# Patient Record
Sex: MALE | Race: WHITE | NOT HISPANIC OR LATINO | Employment: FULL TIME | URBAN - METROPOLITAN AREA
[De-identification: names, ages, dates, MRNs, and addresses within clinical notes are randomized per-mention and may not be internally consistent; named-entity substitution may affect disease eponyms.]

---

## 2023-11-03 PROBLEM — N18.9 ACUTE KIDNEY INJURY SUPERIMPOSED ON CHRONIC KIDNEY DISEASE: Status: ACTIVE | Noted: 2023-11-03

## 2023-11-03 PROBLEM — I10 PRIMARY HYPERTENSION: Status: ACTIVE | Noted: 2023-11-03

## 2023-11-03 PROBLEM — E11.9 TYPE 2 DIABETES MELLITUS, WITHOUT LONG-TERM CURRENT USE OF INSULIN: Status: ACTIVE | Noted: 2023-11-03

## 2023-11-03 PROBLEM — I25.10 CORONARY ARTERY DISEASE: Status: ACTIVE | Noted: 2023-11-03

## 2023-11-03 PROBLEM — D50.9 IRON DEFICIENCY ANEMIA: Status: ACTIVE | Noted: 2023-11-03

## 2023-11-03 PROBLEM — K92.2 LOWER GI BLEED: Status: ACTIVE | Noted: 2023-11-03

## 2023-11-03 PROBLEM — G47.33 OSA (OBSTRUCTIVE SLEEP APNEA): Status: ACTIVE | Noted: 2023-11-03

## 2023-11-03 PROBLEM — N17.9 ACUTE KIDNEY INJURY SUPERIMPOSED ON CHRONIC KIDNEY DISEASE: Status: ACTIVE | Noted: 2023-11-03

## 2023-11-03 PROBLEM — D50.0 ANEMIA DUE TO GI BLOOD LOSS: Status: ACTIVE | Noted: 2023-11-03

## 2023-11-03 PROBLEM — N40.0 BPH (BENIGN PROSTATIC HYPERPLASIA): Status: ACTIVE | Noted: 2023-11-03

## 2023-11-16 ENCOUNTER — TELEPHONE (OUTPATIENT)
Dept: NEPHROLOGY | Facility: CLINIC | Age: 53
End: 2023-11-16

## 2023-11-16 NOTE — TELEPHONE ENCOUNTER
----- Message from Daisy Rider sent at 11/16/2023 10:05 AM CST -----  Type:  Sooner Apoointment Request    Caller is requesting a sooner appointment.  Caller declined first available appointment listed below.  Caller will not accept being placed on the waitlist and is requesting a message be sent to doctor.  Name of Caller:pt   When is the first available appointment?  Symptoms:high blood pressure low blood count/CKD 3   Would the patient rather a call back or a response via [facility]? call  Best Call Back Number:[phone]  Additional Information: would like to est care with provider

## 2023-11-16 NOTE — TELEPHONE ENCOUNTER
----- Message from Nani Esquivel sent at 11/16/2023 10:51 AM CST -----  Contact: self  Type:  Patient Returning Call    Who Called:  pt  Who Left Message for Patient:  rk  Does the patient know what this is regarding?:  yes  Best Call Back Number:  [phone]   Additional Information:  please call

## 2024-02-05 PROBLEM — K92.2 LOWER GI BLEED: Status: RESOLVED | Noted: 2023-11-03 | Resolved: 2024-02-05

## 2024-02-05 PROBLEM — N17.9 ACUTE KIDNEY INJURY SUPERIMPOSED ON CHRONIC KIDNEY DISEASE: Status: RESOLVED | Noted: 2023-11-03 | Resolved: 2024-02-05

## 2024-02-05 PROBLEM — N18.9 ACUTE KIDNEY INJURY SUPERIMPOSED ON CHRONIC KIDNEY DISEASE: Status: RESOLVED | Noted: 2023-11-03 | Resolved: 2024-02-05

## 2024-06-12 ENCOUNTER — TELEPHONE (OUTPATIENT)
Dept: NEPHROLOGY | Facility: CLINIC | Age: 54
End: 2024-06-12
Payer: COMMERCIAL

## 2024-06-12 NOTE — TELEPHONE ENCOUNTER
----- Message from Michoacano Wolf sent at 6/12/2024  9:06 AM CDT -----  Regarding: Sooner Appt  Type:  Sooner Appointment Request    Caller is requesting a sooner appointment.  Caller declined first available appointment listed below.  Caller will not accept being placed on the waitlist and is requesting a message be sent to doctor.    Name of Caller:Pt    When is the first available appointment?sept    Symptoms:est care/ clearance for a cardio procedure    Would the patient rather a call back or a response via [facility]? Call back    Best Call Back Number:[phone]      Additional Information: Sts he really would like to see Dr France .   Please advise -- Thank you

## 2024-06-13 NOTE — TELEPHONE ENCOUNTER
Elif scheduled patient to come in Monday at 1 pm.I called the patient and verified patient's appointment.

## 2024-06-13 NOTE — TELEPHONE ENCOUNTER
----- Message from Larry Rosales sent at 6/13/2024  1:16 PM CDT -----  Regarding: ret call  Contact: patient  Type:  Patient Returning Call    Who Called:  Patient     Who Left Message for Patient:  Elif Murphy    Does the patient know what this is regarding?:  yes     Best Call Back Number:  [phone]    Additional Information:  thanks

## 2024-06-17 ENCOUNTER — OFFICE VISIT (OUTPATIENT)
Dept: NEPHROLOGY | Facility: CLINIC | Age: 54
End: 2024-06-17
Payer: COMMERCIAL

## 2024-06-17 VITALS
HEIGHT: 74 IN | DIASTOLIC BLOOD PRESSURE: 76 MMHG | HEART RATE: 72 BPM | OXYGEN SATURATION: 98 % | BODY MASS INDEX: 32.04 KG/M2 | SYSTOLIC BLOOD PRESSURE: 172 MMHG

## 2024-06-17 DIAGNOSIS — N18.4 TYPE 2 DIABETES MELLITUS WITH STAGE 4 CHRONIC KIDNEY DISEASE, WITHOUT LONG-TERM CURRENT USE OF INSULIN: Primary | ICD-10-CM

## 2024-06-17 DIAGNOSIS — N40.0 BENIGN PROSTATIC HYPERPLASIA, UNSPECIFIED WHETHER LOWER URINARY TRACT SYMPTOMS PRESENT: ICD-10-CM

## 2024-06-17 DIAGNOSIS — I25.10 CORONARY ARTERY DISEASE, UNSPECIFIED VESSEL OR LESION TYPE, UNSPECIFIED WHETHER ANGINA PRESENT, UNSPECIFIED WHETHER NATIVE OR TRANSPLANTED HEART: ICD-10-CM

## 2024-06-17 DIAGNOSIS — I10 PRIMARY HYPERTENSION: ICD-10-CM

## 2024-06-17 DIAGNOSIS — E11.22 TYPE 2 DIABETES MELLITUS WITH STAGE 4 CHRONIC KIDNEY DISEASE, WITHOUT LONG-TERM CURRENT USE OF INSULIN: Primary | ICD-10-CM

## 2024-06-17 PROCEDURE — 3078F DIAST BP <80 MM HG: CPT | Mod: CPTII,S$GLB,, | Performed by: STUDENT IN AN ORGANIZED HEALTH CARE EDUCATION/TRAINING PROGRAM

## 2024-06-17 PROCEDURE — 3066F NEPHROPATHY DOC TX: CPT | Mod: CPTII,S$GLB,, | Performed by: STUDENT IN AN ORGANIZED HEALTH CARE EDUCATION/TRAINING PROGRAM

## 2024-06-17 PROCEDURE — 99999 PR PBB SHADOW E&M-EST. PATIENT-LVL III: CPT | Mod: PBBFAC,,, | Performed by: STUDENT IN AN ORGANIZED HEALTH CARE EDUCATION/TRAINING PROGRAM

## 2024-06-17 PROCEDURE — 4010F ACE/ARB THERAPY RXD/TAKEN: CPT | Mod: CPTII,S$GLB,, | Performed by: STUDENT IN AN ORGANIZED HEALTH CARE EDUCATION/TRAINING PROGRAM

## 2024-06-17 PROCEDURE — 3008F BODY MASS INDEX DOCD: CPT | Mod: CPTII,S$GLB,, | Performed by: STUDENT IN AN ORGANIZED HEALTH CARE EDUCATION/TRAINING PROGRAM

## 2024-06-17 PROCEDURE — 99204 OFFICE O/P NEW MOD 45 MIN: CPT | Mod: S$GLB,,, | Performed by: STUDENT IN AN ORGANIZED HEALTH CARE EDUCATION/TRAINING PROGRAM

## 2024-06-17 PROCEDURE — 1159F MED LIST DOCD IN RCRD: CPT | Mod: CPTII,S$GLB,, | Performed by: STUDENT IN AN ORGANIZED HEALTH CARE EDUCATION/TRAINING PROGRAM

## 2024-06-17 PROCEDURE — 3077F SYST BP >= 140 MM HG: CPT | Mod: CPTII,S$GLB,, | Performed by: STUDENT IN AN ORGANIZED HEALTH CARE EDUCATION/TRAINING PROGRAM

## 2024-06-17 RX ORDER — PANTOPRAZOLE SODIUM 40 MG/1
40 TABLET, DELAYED RELEASE ORAL
COMMUNITY

## 2024-06-17 RX ORDER — FUROSEMIDE 40 MG/1
40 TABLET ORAL DAILY PRN
COMMUNITY
End: 2024-06-17 | Stop reason: SDUPTHER

## 2024-06-17 RX ORDER — VALSARTAN 320 MG/1
1 TABLET ORAL DAILY
COMMUNITY
Start: 2023-11-07

## 2024-06-17 RX ORDER — CLONIDINE HYDROCHLORIDE 0.1 MG/1
0.2 TABLET ORAL
COMMUNITY
End: 2024-06-17

## 2024-06-17 RX ORDER — NITROGLYCERIN 0.4 MG/1
TABLET SUBLINGUAL
COMMUNITY

## 2024-06-17 RX ORDER — FUROSEMIDE 40 MG/1
40 TABLET ORAL 2 TIMES DAILY
Qty: 180 TABLET | Refills: 3 | Status: SHIPPED | OUTPATIENT
Start: 2024-06-17 | End: 2025-06-12

## 2024-06-17 RX ORDER — CLONIDINE HYDROCHLORIDE 0.2 MG/1
TABLET ORAL
COMMUNITY

## 2024-06-17 RX ORDER — EMPAGLIFLOZIN 10 MG/1
1 TABLET, FILM COATED ORAL DAILY
COMMUNITY
Start: 2023-11-07

## 2024-06-17 NOTE — PROGRESS NOTES
Subjective:       Patient ID: Yo Hassan is a 54 y.o. White male who presents for new patient evaluation for chronic renal failure.    Yo Hassan is referred by Gagan Sunshine NP-C to be evaluated for chronic renal failure.  He has a pertinent past medical history of diabetes mellitus type 2 with history of diabetic retinopathy (dx 2007), hypertension (dx around 20 years ago), sleep apnea on CPAP (2019), coronary artery disease s/p stenting and prior history of reported chronic kidney disease.  We reviewed his lab trends at chair side. Serum creatinine every much last year very between 1.4 and 1.8 mg/dL. Most recent kidney panel from 2 weeks ago shows a serum creatinine now 2.9 mg/dL with an calculated EGFR 25 mL a minute.    In terms of his renal history, he denies hospitalizations for prior SAGE requiring RRT. He had a remote history of recurrent kidney stones d/t primary hyperparathyroidism. No reported history of frequent or recurrent use of NSAIDs/COXI. He has a history of morphea, localized scleroderma. Denies any pertinent family history of known kidney disease, or family members diagnosed with ESRD requiring dialysis.  Smoking Hx: occasional 1-2 cigs per month  Other pertinent urologic history: has BPH symptoms, no formal dx  Fluid intake: drinks 1-2L of water per day.     He has no uremic or symptoms and is in his usual state of health. He does report occasional frothy urine. He does report increased fatigue last week but improving recently. He reports gaining 30lbs over the last six months.  When factoring today's height and weight into the 2021 CKD epi equation, his EGFR is close to 36 mL a minute.      Review of Systems   Constitutional:  Positive for fatigue and unexpected weight change. Negative for chills, diaphoresis and fever.   Respiratory:  Negative for cough and shortness of breath.    Cardiovascular:  Negative for chest pain and leg swelling.   Gastrointestinal:  Negative for  abdominal pain, diarrhea, nausea and vomiting.   Genitourinary:  Negative for difficulty urinating, dysuria and hematuria.   Musculoskeletal:  Negative for myalgias.   Neurological:  Negative for headaches.   Hematological:  Does not bruise/bleed easily.       The past medical, family and social histories were reviewed for this encounter.     Past Medical History:   Diagnosis Date    Diabetes mellitus     Hypertension     Sleep apnea      Past Surgical History:   Procedure Laterality Date    COLONOSCOPY N/A 11/2/2023    Procedure: COLONOSCOPY with EMR;  Surgeon: Larry Howard Jr., MD;  Location: [facility] ENDO;  Service: Endoscopy;  Laterality: N/A;    COLONOSCOPY N/A 11/3/2023    Procedure: COLONOSCOPY;  Surgeon: Larry Howard Jr., MD;  Location: [facility] ENDO;  Service: Endoscopy;  Laterality: N/A;    CORONARY ANGIOGRAPHY      x2 stents    ESOPHAGOGASTRODUODENOSCOPY N/A 11/2/2023    Procedure: EGD (ESOPHAGOGASTRODUODENOSCOPY);  Surgeon: Larry Howard Jr., MD;  Location: [facility] ENDO;  Service: Endoscopy;  Laterality: N/A;    HERNIA REPAIR       Social History     Socioeconomic History    Marital status:    Tobacco Use    Smoking status: Never     Current Outpatient Medications   Medication Sig    aspirin 81 MG Chew Take 81 mg by mouth once daily. **ON HOLD FOR 5 DAYS 11-7-2023**    carvediloL (COREG) 25 MG tablet Take 25 mg by mouth 2 (two) times daily.    cloNIDine (CATAPRES) 0.2 MG tablet TAKE 1 TABLET BY MOUTH AS NEEDED FOR BLOOD PRESSURE OVER 160    clopidogreL (PLAVIX) 75 mg tablet Take 75 mg by mouth once daily. **ON HOLD FOR 5 DAYS 11-7-2023**    cyclobenzaprine (FLEXERIL) 10 MG tablet Take 10 mg by mouth 2 (two) times daily as needed for Muscle spasms.    ezetimibe (ZETIA) 10 mg tablet Take 10 mg by mouth once daily.    fenofibrate 160 MG Tab Take 160 mg by mouth once daily.    ferrous sulfate (FEOSOL) 325 mg (65 mg iron) Tab tablet Take 1 tablet (325 mg total) by mouth every other day.    furosemide  "(LASIX) 40 MG tablet Take 40 mg by mouth daily as needed.    glimepiride (AMARYL) 4 MG tablet Take 1 tablet by mouth 2 (two) times daily.    JARDIANCE 10 mg tablet Take 1 tablet by mouth once daily.    NIFEdipine (ADALAT CC) 60 MG TbSR Take 90 mg by mouth every evening.    nitroGLYCERIN (NITROSTAT) 0.4 MG SL tablet use as directed    pantoprazole (PROTONIX) 40 MG tablet Take 40 mg by mouth.    tamsulosin (FLOMAX) 0.4 mg Cap Take 0.4 mg by mouth once daily.    valsartan (DIOVAN) 320 MG tablet Take 1 tablet by mouth once daily.    valsartan-hydrochlorothiazide (DIOVAN-HCT) 320-25 mg per tablet Take 1 tablet by mouth once daily.    zolpidem (AMBIEN) 10 mg Tab Take 10 mg by mouth nightly as needed for Insomnia.    cloNIDine (CATAPRES) 0.1 MG tablet 0.2 mg. (Patient not taking: Reported on 6/17/2024)    insulin detemir U-100, Levemir, 100 unit/mL (3 mL) SubQ InPn pen Inject 10 Units into the skin every evening. (Patient not taking: Reported on 6/17/2024)    vit C,H-On-hosms-lutein-zeaxan (EYE MULTIVIT, LUTEIN-ZEAXAN,) 410-36-20-2-5 mg Cap Take 1 capsule by mouth once. (Patient not taking: Reported on 6/17/2024)     No current facility-administered medications for this visit.     BP (!) 172/76 (BP Location: Right arm, Patient Position: Sitting, BP Method: Large (Manual))   Pulse 72   Ht 6' 2" (1.88 m)   SpO2 98%   BMI 32.04 kg/m²     Objective:      Physical Exam  Vitals reviewed.   Constitutional:       General: He is not in acute distress.     Appearance: He is obese.   HENT:      Head: Normocephalic and atraumatic.      Right Ear: External ear normal.      Left Ear: External ear normal.      Nose: Nose normal. No congestion.      Mouth/Throat:      Mouth: Mucous membranes are moist.      Pharynx: Oropharynx is clear. No oropharyngeal exudate or posterior oropharyngeal erythema.   Eyes:      General: No scleral icterus.     Extraocular Movements: Extraocular movements intact.   Cardiovascular:      Rate and Rhythm: " "Normal rate and regular rhythm.      Pulses: Normal pulses.      Heart sounds: Normal heart sounds.      No friction rub.   Pulmonary:      Effort: Pulmonary effort is normal. No respiratory distress.      Breath sounds: Normal breath sounds.   Abdominal:      General: Abdomen is protuberant.      Palpations: Abdomen is soft.   Musculoskeletal:         General: Swelling present.      Cervical back: Normal range of motion. No tenderness.      Right lower leg: Edema (Plus one pretibial edema) present.      Left lower leg: Edema (+1 pretibial edema) present.   Neurological:      General: No focal deficit present.      Mental Status: He is oriented to person, place, and time.      Motor: No weakness.   Psychiatric:         Mood and Affect: Mood normal.         Behavior: Behavior normal.         Assessment:     Lab Results   Component Value Date    CREATININE 1.73 (H) 11/05/2023    BUN 15 11/05/2023     11/05/2023    K 3.5 11/05/2023     11/05/2023    CO2 30 11/05/2023     Lab Results   Component Value Date    CALCIUM 8.4 11/05/2023     Lab Results   Component Value Date    HCT 22.2 (L) 11/05/2023     No results found for: "UTPCR"    No results found for: "MICALBCREAT"        1. Type 2 diabetes mellitus with stage 4 chronic kidney disease, without long-term current use of insulin    2. Benign prostatic hyperplasia, unspecified whether lower urinary tract symptoms present    3. Primary hypertension    4. Coronary artery disease, unspecified vessel or lesion type, unspecified whether angina present, unspecified whether native or transplanted heart        Chronic kidney disease stage IIIB/A?  -risks/etiology: Has longstanding history of diabetes mellitus type 2 on insulin with diabetic retinopathy, thus high probability diabetic kidney disease, longstanding hypertension  -we will send for imaging with Doppler study today  -UA notable for 3+ protein 3+ glucose (Jardiance), and 1+ RBCs likely glomerular  -continue " discussion and adjustment of modifiable risk factors. Educated patient on the importance of BP, glycemic and lipid control       Plan:   Return to clinic in 4-6 weeks.  Labs for next visit include doppler ultrasound, cmp, mg, phos, cbc, pth, vitamind d 25, ua, uacr, upcr.   Baseline creatinine is 2.4-2.9 mg/dL.     CKD IIIb/A? - on RASI and SGLT2-I for CKD-MACE risk reduction, proteinuria reduction and michelle-protection    HTN - he is on multiple agents in his prescribed a diuretic though he has not taking the diuretic daily.  Thus difficult to discern if this is resistant hypertension.  I reviewed his medication list in would continue all agents and start the Lasix daily to facilitate naturesis.  Encouraged patient to avoid dehydration    Anemia in CKD - f/u cbc, check iron profile with history of ALETHEA.    PEACE-encouraged CPAP compliance with history of hypertension    MBD evaluation-check PTH, Mag, phos, vitamin-D    Elevated BMI - We discussed making lifestyle changes and using a Mediterranean diet for health benefits and weight loss.  This diet also is beneficial in improving HTN, DM, protein in urine as well as kidney function.  aerobic exercise at least 3x weekly as tolerated      Jewel France MD  [facility] [place]    Part of this note has been created using [employer] dictation system. Errors in transcription may not be completely avoided.

## 2024-06-17 NOTE — PATIENT INSTRUCTIONS
I put together a guide for patients which includes some healthy tips from the [employer]:   Avoiding fluctuations in blood pressure (high and low spikes)  Maintain good glucose control if you have diabetes  Reduce/avoid extra salt intake  Avoid over the counter pain medications (NSAIDs)  Aerobic exercise at least 3x weekly as tolerated  Eat more whole foods (vegetables, fruit, fish, chicken, pork, beef, dairy) and less processed food  Control weight  Avoid smoking  Stay hydrated, avoid dehydration.  Annual flu shot and routine preventative cancer screening via your primary care doctor.     -Marcin France MD

## 2024-06-25 DIAGNOSIS — R80.9 PROTEINURIA, UNSPECIFIED TYPE: ICD-10-CM

## 2024-06-25 DIAGNOSIS — E11.22 TYPE 2 DIABETES MELLITUS WITH STAGE 4 CHRONIC KIDNEY DISEASE, WITHOUT LONG-TERM CURRENT USE OF INSULIN: Primary | ICD-10-CM

## 2024-06-25 DIAGNOSIS — N18.4 TYPE 2 DIABETES MELLITUS WITH STAGE 4 CHRONIC KIDNEY DISEASE, WITHOUT LONG-TERM CURRENT USE OF INSULIN: Primary | ICD-10-CM

## 2024-07-26 ENCOUNTER — TELEPHONE (OUTPATIENT)
Dept: NEPHROLOGY | Facility: CLINIC | Age: 54
End: 2024-07-26
Payer: COMMERCIAL

## 2024-07-26 NOTE — TELEPHONE ENCOUNTER
----- Message from Saran Calabrese sent at 7/26/2024 10:38 AM CDT -----  Type: Needs Medical Advice  Who Called:  Brad from [facility]  Symptoms (please be specific):  said she need office notes on pt--please call and advise    Best Call Back Number: [phone] option [phone]  Additional Information: thank you

## 2024-08-05 ENCOUNTER — OFFICE VISIT (OUTPATIENT)
Dept: NEPHROLOGY | Facility: CLINIC | Age: 54
End: 2024-08-05
Payer: COMMERCIAL

## 2024-08-05 VITALS — OXYGEN SATURATION: 97 % | DIASTOLIC BLOOD PRESSURE: 86 MMHG | SYSTOLIC BLOOD PRESSURE: 154 MMHG | HEART RATE: 60 BPM

## 2024-08-05 DIAGNOSIS — E11.21 DIABETIC NEPHROPATHY ASSOCIATED WITH TYPE 2 DIABETES MELLITUS: ICD-10-CM

## 2024-08-05 DIAGNOSIS — I10 PRIMARY HYPERTENSION: ICD-10-CM

## 2024-08-05 DIAGNOSIS — N40.0 BENIGN PROSTATIC HYPERPLASIA, UNSPECIFIED WHETHER LOWER URINARY TRACT SYMPTOMS PRESENT: ICD-10-CM

## 2024-08-05 DIAGNOSIS — R80.9 PROTEINURIA, UNSPECIFIED TYPE: ICD-10-CM

## 2024-08-05 DIAGNOSIS — N18.4 TYPE 2 DIABETES MELLITUS WITH STAGE 4 CHRONIC KIDNEY DISEASE, WITHOUT LONG-TERM CURRENT USE OF INSULIN: Primary | ICD-10-CM

## 2024-08-05 DIAGNOSIS — N18.4 STAGE 4 CHRONIC KIDNEY DISEASE: ICD-10-CM

## 2024-08-05 DIAGNOSIS — E11.22 TYPE 2 DIABETES MELLITUS WITH STAGE 4 CHRONIC KIDNEY DISEASE, WITHOUT LONG-TERM CURRENT USE OF INSULIN: Primary | ICD-10-CM

## 2024-08-05 DIAGNOSIS — I25.10 CORONARY ARTERY DISEASE, UNSPECIFIED VESSEL OR LESION TYPE, UNSPECIFIED WHETHER ANGINA PRESENT, UNSPECIFIED WHETHER NATIVE OR TRANSPLANTED HEART: ICD-10-CM

## 2024-08-05 PROCEDURE — 3061F NEG MICROALBUMINURIA REV: CPT | Mod: CPTII,S$GLB,, | Performed by: STUDENT IN AN ORGANIZED HEALTH CARE EDUCATION/TRAINING PROGRAM

## 2024-08-05 PROCEDURE — 3077F SYST BP >= 140 MM HG: CPT | Mod: CPTII,S$GLB,, | Performed by: STUDENT IN AN ORGANIZED HEALTH CARE EDUCATION/TRAINING PROGRAM

## 2024-08-05 PROCEDURE — 3079F DIAST BP 80-89 MM HG: CPT | Mod: CPTII,S$GLB,, | Performed by: STUDENT IN AN ORGANIZED HEALTH CARE EDUCATION/TRAINING PROGRAM

## 2024-08-05 PROCEDURE — 3066F NEPHROPATHY DOC TX: CPT | Mod: CPTII,S$GLB,, | Performed by: STUDENT IN AN ORGANIZED HEALTH CARE EDUCATION/TRAINING PROGRAM

## 2024-08-05 PROCEDURE — 99214 OFFICE O/P EST MOD 30 MIN: CPT | Mod: S$GLB,,, | Performed by: STUDENT IN AN ORGANIZED HEALTH CARE EDUCATION/TRAINING PROGRAM

## 2024-08-05 PROCEDURE — 99999 PR PBB SHADOW E&M-EST. PATIENT-LVL III: CPT | Mod: PBBFAC,,, | Performed by: STUDENT IN AN ORGANIZED HEALTH CARE EDUCATION/TRAINING PROGRAM

## 2024-08-05 PROCEDURE — 4010F ACE/ARB THERAPY RXD/TAKEN: CPT | Mod: CPTII,S$GLB,, | Performed by: STUDENT IN AN ORGANIZED HEALTH CARE EDUCATION/TRAINING PROGRAM

## 2024-08-05 PROCEDURE — 1159F MED LIST DOCD IN RCRD: CPT | Mod: CPTII,S$GLB,, | Performed by: STUDENT IN AN ORGANIZED HEALTH CARE EDUCATION/TRAINING PROGRAM

## 2024-08-05 RX ORDER — BLOOD-GLUCOSE SENSOR
1 EACH MISCELLANEOUS
Qty: 3 EACH | Refills: 5 | Status: SHIPPED | OUTPATIENT
Start: 2024-08-05 | End: 2025-08-05

## 2024-09-24 ENCOUNTER — OFFICE VISIT (OUTPATIENT)
Dept: NEPHROLOGY | Facility: CLINIC | Age: 54
End: 2024-09-24
Payer: COMMERCIAL

## 2024-09-24 ENCOUNTER — PATIENT MESSAGE (OUTPATIENT)
Dept: NEPHROLOGY | Facility: CLINIC | Age: 54
End: 2024-09-24

## 2024-09-24 VITALS
BODY MASS INDEX: 34.54 KG/M2 | SYSTOLIC BLOOD PRESSURE: 130 MMHG | DIASTOLIC BLOOD PRESSURE: 58 MMHG | HEART RATE: 70 BPM | OXYGEN SATURATION: 97 % | HEIGHT: 74 IN

## 2024-09-24 DIAGNOSIS — I10 PRIMARY HYPERTENSION: ICD-10-CM

## 2024-09-24 DIAGNOSIS — E11.21 DIABETIC NEPHROPATHY ASSOCIATED WITH TYPE 2 DIABETES MELLITUS: ICD-10-CM

## 2024-09-24 DIAGNOSIS — I25.10 CORONARY ARTERY DISEASE, UNSPECIFIED VESSEL OR LESION TYPE, UNSPECIFIED WHETHER ANGINA PRESENT, UNSPECIFIED WHETHER NATIVE OR TRANSPLANTED HEART: ICD-10-CM

## 2024-09-24 DIAGNOSIS — E11.22 TYPE 2 DIABETES MELLITUS WITH STAGE 4 CHRONIC KIDNEY DISEASE, WITHOUT LONG-TERM CURRENT USE OF INSULIN: Primary | ICD-10-CM

## 2024-09-24 DIAGNOSIS — N40.0 BENIGN PROSTATIC HYPERPLASIA, UNSPECIFIED WHETHER LOWER URINARY TRACT SYMPTOMS PRESENT: ICD-10-CM

## 2024-09-24 DIAGNOSIS — R80.9 PROTEINURIA, UNSPECIFIED TYPE: ICD-10-CM

## 2024-09-24 DIAGNOSIS — N18.4 STAGE 4 CHRONIC KIDNEY DISEASE: ICD-10-CM

## 2024-09-24 DIAGNOSIS — N18.4 TYPE 2 DIABETES MELLITUS WITH STAGE 4 CHRONIC KIDNEY DISEASE, WITHOUT LONG-TERM CURRENT USE OF INSULIN: Primary | ICD-10-CM

## 2024-09-24 PROCEDURE — 3078F DIAST BP <80 MM HG: CPT | Mod: CPTII,S$GLB,, | Performed by: STUDENT IN AN ORGANIZED HEALTH CARE EDUCATION/TRAINING PROGRAM

## 2024-09-24 PROCEDURE — 3066F NEPHROPATHY DOC TX: CPT | Mod: CPTII,S$GLB,, | Performed by: STUDENT IN AN ORGANIZED HEALTH CARE EDUCATION/TRAINING PROGRAM

## 2024-09-24 PROCEDURE — 1159F MED LIST DOCD IN RCRD: CPT | Mod: CPTII,S$GLB,, | Performed by: STUDENT IN AN ORGANIZED HEALTH CARE EDUCATION/TRAINING PROGRAM

## 2024-09-24 PROCEDURE — 3008F BODY MASS INDEX DOCD: CPT | Mod: CPTII,S$GLB,, | Performed by: STUDENT IN AN ORGANIZED HEALTH CARE EDUCATION/TRAINING PROGRAM

## 2024-09-24 PROCEDURE — 3075F SYST BP GE 130 - 139MM HG: CPT | Mod: CPTII,S$GLB,, | Performed by: STUDENT IN AN ORGANIZED HEALTH CARE EDUCATION/TRAINING PROGRAM

## 2024-09-24 PROCEDURE — 3061F NEG MICROALBUMINURIA REV: CPT | Mod: CPTII,S$GLB,, | Performed by: STUDENT IN AN ORGANIZED HEALTH CARE EDUCATION/TRAINING PROGRAM

## 2024-09-24 PROCEDURE — 1160F RVW MEDS BY RX/DR IN RCRD: CPT | Mod: CPTII,S$GLB,, | Performed by: STUDENT IN AN ORGANIZED HEALTH CARE EDUCATION/TRAINING PROGRAM

## 2024-09-24 PROCEDURE — 4010F ACE/ARB THERAPY RXD/TAKEN: CPT | Mod: CPTII,S$GLB,, | Performed by: STUDENT IN AN ORGANIZED HEALTH CARE EDUCATION/TRAINING PROGRAM

## 2024-09-24 PROCEDURE — 99999 PR PBB SHADOW E&M-EST. PATIENT-LVL IV: CPT | Mod: PBBFAC,,, | Performed by: STUDENT IN AN ORGANIZED HEALTH CARE EDUCATION/TRAINING PROGRAM

## 2024-09-24 PROCEDURE — 99214 OFFICE O/P EST MOD 30 MIN: CPT | Mod: S$GLB,,, | Performed by: STUDENT IN AN ORGANIZED HEALTH CARE EDUCATION/TRAINING PROGRAM

## 2024-09-24 RX ORDER — POTASSIUM CHLORIDE 20 MEQ/1
20 TABLET, EXTENDED RELEASE ORAL DAILY
Qty: 30 TABLET | Refills: 11 | Status: SHIPPED | OUTPATIENT
Start: 2024-09-24 | End: 2025-09-19

## 2024-09-24 RX ORDER — DAPAGLIFLOZIN 10 MG/1
10 TABLET, FILM COATED ORAL DAILY
Qty: 30 TABLET | Refills: 11 | Status: SHIPPED | OUTPATIENT
Start: 2024-09-24 | End: 2025-09-19

## 2024-09-24 NOTE — PROGRESS NOTES
Subjective:       Patient ID: Yo Hassan is a 54 y.o. male who returns for ongoing evaluation and management of CKD.   He has a pertinent past medical history of diabetes mellitus type 2 with history of diabetic retinopathy (dx 2007), hypertension (dx around 20 years ago), sleep apnea on CPAP (2019), coronary artery disease s/p stenting and prior history of reported chronic kidney disease.  We reviewed his lab trends at chair side. Serum creatinine every much last year very between 1.4 and 1.8 mg/dL. Most recent kidney panel from 2 weeks ago shows a serum creatinine now 2.9 mg/dL with an calculated EGFR 25 mL a minute.    In terms of his renal history, he denies hospitalizations for prior SAGE requiring RRT. He had a remote history of recurrent kidney stones d/t primary hyperparathyroidism. No reported history of frequent or recurrent use of NSAIDs/COXI. He has a history of morphea, localized scleroderma. Denies any pertinent family history of known kidney disease, or family members diagnosed with ESRD requiring dialysis.  Smoking Hx: occasional 1-2 cigs per month  Other pertinent urologic history: has BPH symptoms, no formal dx  Fluid intake: drinks 1-2L of water per day.     He has no uremic or symptoms and is in his usual state of health. He does report occasional frothy urine. He does report increased fatigue last week but improving recently. He reports gaining 30lbs over the last six months.  When factoring today's height and weight into the 2021 CKD epi equation, his EGFR is close to 36 mL a minute.    Interval history:  8/5/24 clinic visit  -completed random renal biopsy on 7/25/24 ft nodular diabetic GS RPS III. Ft Moderate tubular atrophy and severe interstitial fibrosis. Discussed results and the prognosis that he will likely need RRT at some point in his lifetime (20-90%) risk of progression to ESRD over next 5 years per [facility]  -feels well otherwise and denies acute complaints.   -will go for labs  this week  -He has no uremic or urinary symptoms and is in his usual state of health.      9/24/24 - patient is seen for ongoing CKD follow up today.  He continues to feel okay though noticed intermittent swelling of his lower extremities on occasion.  He is still taking Lasix b.i.d. with good urine output.  We did review his labs at chair side today.  Serum creatinine is up to 3.62 mg/dL now with a GFR of 19.  -we did introduce modality education today, he is interested in peritoneal dialysis should he need renal replacement therapy services.  -he is interested in transplant evaluation, we will make a referral to tolerate transplant since he works at [employer], it may be easier for him to make his appointments and workup within the same building.    Review of Systems   Constitutional:  Positive for fatigue and unexpected weight change. Negative for chills, diaphoresis and fever.   Respiratory:  Negative for cough and shortness of breath.    Cardiovascular:  Negative for chest pain and leg swelling.   Gastrointestinal:  Negative for abdominal pain, diarrhea, nausea and vomiting.   Genitourinary:  Negative for difficulty urinating, dysuria and hematuria.   Musculoskeletal:  Negative for myalgias.   Neurological:  Negative for headaches.   Hematological:  Does not bruise/bleed easily.       The past medical, family and social histories were reviewed for this encounter.     Past Medical History:   Diagnosis Date    Diabetes mellitus     Hypertension     Sleep apnea        Current Outpatient Medications   Medication Sig    aspirin 81 MG Chew Take 81 mg by mouth once daily. **ON HOLD FOR 5 DAYS 11-7-2023**    blood-glucose sensor (DEXCOM G7 SENSOR) Cat 1 Device by Misc.(Non-Drug; Combo Route) route every 10 days.    carvediloL (COREG) 25 MG tablet Take 25 mg by mouth 2 (two) times daily.    cloNIDine (CATAPRES) 0.2 MG tablet TAKE 1 TABLET BY MOUTH AS NEEDED FOR BLOOD PRESSURE OVER 160    clopidogreL (PLAVIX) 75  "mg tablet Take 75 mg by mouth once daily. **ON HOLD FOR 5 DAYS 11-7-2023**    cyclobenzaprine (FLEXERIL) 10 MG tablet Take 10 mg by mouth 2 (two) times daily as needed for Muscle spasms.    ferrous sulfate (FEOSOL) 325 mg (65 mg iron) Tab tablet Take 1 tablet (325 mg total) by mouth every other day.    furosemide (LASIX) 40 MG tablet Take 1 tablet (40 mg total) by mouth 2 (two) times a day.    glimepiride (AMARYL) 4 MG tablet Take 1 tablet by mouth 2 (two) times daily.    JARDIANCE 10 mg tablet Take 1 tablet by mouth once daily.    NIFEdipine (ADALAT CC) 60 MG TbSR Take 90 mg by mouth every evening.    pantoprazole (PROTONIX) 40 MG tablet Take 40 mg by mouth.    tamsulosin (FLOMAX) 0.4 mg Cap Take 0.4 mg by mouth once daily.    valsartan (DIOVAN) 320 MG tablet Take 1 tablet by mouth once daily.    vit C,G-If-nerjm-lutein-zeaxan (EYE MULTIVIT, LUTEIN-ZEAXAN,) 375-88-45-2-5 mg Cap Take 1 capsule by mouth once.    zolpidem (AMBIEN) 10 mg Tab Take 10 mg by mouth nightly as needed for Insomnia.    ezetimibe (ZETIA) 10 mg tablet Take 10 mg by mouth once daily. (Patient not taking: Reported on 8/5/2024)    nitroGLYCERIN (NITROSTAT) 0.4 MG SL tablet use as directed (Patient not taking: Reported on 9/24/2024)     No current facility-administered medications for this visit.     BP (!) 130/58 (BP Location: Right arm, Patient Position: Sitting, BP Method: Large (Manual))   Pulse 70   Ht 6' 2" (1.88 m)   SpO2 97%   BMI 34.54 kg/m²     Objective:      Physical Exam  Vitals reviewed.   Constitutional:       General: He is not in acute distress.     Appearance: He is obese.   Cardiovascular:      Rate and Rhythm: Normal rate and regular rhythm.      Pulses: Normal pulses.      Heart sounds: Normal heart sounds.      No friction rub.   Pulmonary:      Effort: Pulmonary effort is normal. No respiratory distress.      Breath sounds: Normal breath sounds.   Abdominal:      General: Abdomen is protuberant.      Palpations: Abdomen " is soft.   Musculoskeletal:         General: Swelling present.      Cervical back: Normal range of motion. No tenderness.      Right lower leg: Edema (Plus one pretibial edema) present.      Left lower leg: Edema (+1 pretibial edema) present.   Neurological:      General: No focal deficit present.      Mental Status: He is oriented to person, place, and time.      Motor: No weakness.   Psychiatric:         Mood and Affect: Mood normal.         Behavior: Behavior normal.         Assessment:     Lab Results   Component Value Date    CREATININE 2.99 (H) 06/18/2024    BUN 41 (H) 06/18/2024     06/18/2024    K 4.0 06/18/2024     06/18/2024    CO2 23 06/18/2024     Lab Results   Component Value Date    PTH 14.3 06/18/2024    CALCIUM 9.2 06/18/2024    PHOS 5.0 (H) 06/18/2024     Lab Results   Component Value Date    HCT 31.2 (L) 07/25/2024     Prot/Creat Ratio, Urine   Date Value Ref Range Status   06/18/2024 48228.9 (H) 15.0 - 68.0 mg/g Final       Lab Results   Component Value Date    MICALBCREAT Unable to calculate 06/18/2024           1. Type 2 diabetes mellitus with stage 4 chronic kidney disease, without long-term current use of insulin    2. Diabetic nephropathy associated with type 2 diabetes mellitus    3. Primary hypertension    4. Coronary artery disease, unspecified vessel or lesion type, unspecified whether angina present, unspecified whether native or transplanted heart    5. Stage 4 chronic kidney disease    6. Benign prostatic hyperplasia, unspecified whether lower urinary tract symptoms present    7. Proteinuria, unspecified type            Chronic kidney disease stage IIIB/A3  -risks/etiology: biopsy proven DKD RPS III. High degree IFTA  -imaging reviewed; no doppler evidence of OLESYA  -continue discussion and adjustment of modifiable risk factors. Educated patient on the importance of BP, glycemic and lipid control   -referred to transplant service on 09/24/2024 at 2 point      Plan:   Return  to clinic in 6 weeks.  Labs for next visit include rfp, pth, uacr, upcr, ua  Baseline creatinine is 2.4-2.9 mg/dL.     CKD IV/A3 dt DKD - on RASI and SGLT2-I for CKD-MACE risk reduction, proteinuria reduction and michelle-protection. Increase SGLT2-I to 25mg today.  Start K supplementation  Kidney education referral placed at last visit  Referral to [facility] transplant.   Prefers PD modality should he need RRT prior to transplant  SGLT2 inhibitors becoming cost prohibitive, we will change empagliflozin for dapagliflozin with coupon  Continue close f/u and monitoring.     HTN - BP trends more favorable with daily diuretic use. Continue current regiment for now.    Anemia in CKD - f/u cbc, last iron profile okay    PEACE-encouraged CPAP compliance with history of hypertension    H/o hyperparathyroidism s/p pthectomy - last PTH okay, Mag, phos, vitamin-D okay as well    Elevated BMI - We discussed making lifestyle changes and using a Mediterranean diet for health benefits and weight loss. Discussed maintaining a BMI goal under 40 to keep him eligible for surgery.    DM type II-continue follow up with primary care for titration.  He is a candidate for SGLT2 inhibitor is currently taking.    Hypokalemia-start on oral potassium supplementation daily    Jewel France MD  [facility] [place]    Part of this note has been created using [employer] dictation system. Errors in transcription may not be completely avoided.    Computed KFRE 2-Year unavailable. One or more values for this score either were not found within the given timeframe or did not fit some other criterion.    Computed KFRE 5-Year unavailable. One or more values for this score either were not found within the given timeframe or did not fit some other criterion.

## 2024-09-26 ENCOUNTER — TELEPHONE (OUTPATIENT)
Dept: NEPHROLOGY | Facility: CLINIC | Age: 54
End: 2024-09-26
Payer: COMMERCIAL

## 2024-10-01 ENCOUNTER — TELEPHONE (OUTPATIENT)
Dept: NEPHROLOGY | Facility: CLINIC | Age: 54
End: 2024-10-01
Payer: COMMERCIAL

## 2024-10-02 ENCOUNTER — TELEPHONE (OUTPATIENT)
Dept: NEPHROLOGY | Facility: CLINIC | Age: 54
End: 2024-10-02
Payer: COMMERCIAL

## 2024-10-31 ENCOUNTER — TELEPHONE (OUTPATIENT)
Dept: NEPHROLOGY | Facility: CLINIC | Age: 54
End: 2024-10-31
Payer: COMMERCIAL

## 2024-10-31 DIAGNOSIS — D50.8 IRON DEFICIENCY ANEMIA SECONDARY TO INADEQUATE DIETARY IRON INTAKE: Primary | ICD-10-CM

## 2024-10-31 DIAGNOSIS — N18.4 STAGE 4 CHRONIC KIDNEY DISEASE: Primary | ICD-10-CM

## 2024-11-20 ENCOUNTER — TELEPHONE (OUTPATIENT)
Dept: NEPHROLOGY | Facility: CLINIC | Age: 54
End: 2024-11-20
Payer: COMMERCIAL

## 2024-11-25 ENCOUNTER — TELEPHONE (OUTPATIENT)
Dept: NEPHROLOGY | Facility: CLINIC | Age: 54
End: 2024-11-25
Payer: COMMERCIAL

## 2024-12-13 ENCOUNTER — TELEPHONE (OUTPATIENT)
Dept: NEPHROLOGY | Facility: CLINIC | Age: 54
End: 2024-12-13
Payer: COMMERCIAL

## 2024-12-16 ENCOUNTER — OFFICE VISIT (OUTPATIENT)
Dept: NEPHROLOGY | Facility: CLINIC | Age: 54
End: 2024-12-16
Payer: COMMERCIAL

## 2024-12-16 VITALS — HEIGHT: 74 IN | BODY MASS INDEX: 34.54 KG/M2 | SYSTOLIC BLOOD PRESSURE: 226 MMHG | DIASTOLIC BLOOD PRESSURE: 90 MMHG

## 2024-12-16 DIAGNOSIS — N18.4 TYPE 2 DIABETES MELLITUS WITH STAGE 4 CHRONIC KIDNEY DISEASE, WITHOUT LONG-TERM CURRENT USE OF INSULIN: ICD-10-CM

## 2024-12-16 DIAGNOSIS — N18.5 STAGE 5 CHRONIC KIDNEY DISEASE NOT ON CHRONIC DIALYSIS: Primary | ICD-10-CM

## 2024-12-16 DIAGNOSIS — I10 PRIMARY HYPERTENSION: ICD-10-CM

## 2024-12-16 DIAGNOSIS — E11.22 TYPE 2 DIABETES MELLITUS WITH STAGE 4 CHRONIC KIDNEY DISEASE, WITHOUT LONG-TERM CURRENT USE OF INSULIN: ICD-10-CM

## 2024-12-16 PROCEDURE — 3061F NEG MICROALBUMINURIA REV: CPT | Mod: CPTII,S$GLB,, | Performed by: STUDENT IN AN ORGANIZED HEALTH CARE EDUCATION/TRAINING PROGRAM

## 2024-12-16 PROCEDURE — 1160F RVW MEDS BY RX/DR IN RCRD: CPT | Mod: CPTII,S$GLB,, | Performed by: STUDENT IN AN ORGANIZED HEALTH CARE EDUCATION/TRAINING PROGRAM

## 2024-12-16 PROCEDURE — 4010F ACE/ARB THERAPY RXD/TAKEN: CPT | Mod: CPTII,S$GLB,, | Performed by: STUDENT IN AN ORGANIZED HEALTH CARE EDUCATION/TRAINING PROGRAM

## 2024-12-16 PROCEDURE — 99999 PR PBB SHADOW E&M-EST. PATIENT-LVL III: CPT | Mod: PBBFAC,,, | Performed by: STUDENT IN AN ORGANIZED HEALTH CARE EDUCATION/TRAINING PROGRAM

## 2024-12-16 PROCEDURE — 3008F BODY MASS INDEX DOCD: CPT | Mod: CPTII,S$GLB,, | Performed by: STUDENT IN AN ORGANIZED HEALTH CARE EDUCATION/TRAINING PROGRAM

## 2024-12-16 PROCEDURE — 99214 OFFICE O/P EST MOD 30 MIN: CPT | Mod: S$GLB,,, | Performed by: STUDENT IN AN ORGANIZED HEALTH CARE EDUCATION/TRAINING PROGRAM

## 2024-12-16 PROCEDURE — 3066F NEPHROPATHY DOC TX: CPT | Mod: CPTII,S$GLB,, | Performed by: STUDENT IN AN ORGANIZED HEALTH CARE EDUCATION/TRAINING PROGRAM

## 2024-12-16 PROCEDURE — 1159F MED LIST DOCD IN RCRD: CPT | Mod: CPTII,S$GLB,, | Performed by: STUDENT IN AN ORGANIZED HEALTH CARE EDUCATION/TRAINING PROGRAM

## 2024-12-16 PROCEDURE — 3077F SYST BP >= 140 MM HG: CPT | Mod: CPTII,S$GLB,, | Performed by: STUDENT IN AN ORGANIZED HEALTH CARE EDUCATION/TRAINING PROGRAM

## 2024-12-16 PROCEDURE — 3080F DIAST BP >= 90 MM HG: CPT | Mod: CPTII,S$GLB,, | Performed by: STUDENT IN AN ORGANIZED HEALTH CARE EDUCATION/TRAINING PROGRAM

## 2024-12-16 RX ORDER — PREDNISOLONE ACETATE 10 MG/ML
SUSPENSION/ DROPS OPHTHALMIC
COMMUNITY
Start: 2024-11-14

## 2024-12-16 RX ORDER — FENOFIBRATE 160 MG/1
160 TABLET ORAL
COMMUNITY
Start: 2024-09-27 | End: 2024-12-16

## 2024-12-16 RX ORDER — MAGNESIUM L-LACTATE 84 MG
84 TABLET, EXTENDED RELEASE ORAL
COMMUNITY

## 2024-12-16 RX ORDER — ASCORBIC ACID 1000 MG
1 TABLET ORAL DAILY
COMMUNITY

## 2024-12-16 RX ORDER — CLONIDINE HYDROCHLORIDE 0.2 MG/1
0.2 TABLET ORAL EVERY 8 HOURS PRN
Qty: 90 TABLET | Refills: 11 | Status: SHIPPED | OUTPATIENT
Start: 2024-12-16 | End: 2025-12-11

## 2024-12-16 RX ORDER — FUROSEMIDE 80 MG/1
80 TABLET ORAL 2 TIMES DAILY
Qty: 60 TABLET | Refills: 11 | Status: SHIPPED | OUTPATIENT
Start: 2024-12-16 | End: 2025-12-11

## 2024-12-16 NOTE — PROGRESS NOTES
Subjective:       Patient ID: Yo Hassan is a 54 y.o. male who returns for ongoing evaluation and management of CKD.   He has a pertinent past medical history of diabetes mellitus type 2 with history of diabetic retinopathy (dx 2007), hypertension (dx around 20 years ago), sleep apnea on CPAP (2019), coronary artery disease s/p stenting and prior history of reported chronic kidney disease.  We reviewed his lab trends at chair side. Serum creatinine every much last year very between 1.4 and 1.8 mg/dL. Most recent kidney panel from 2 weeks ago shows a serum creatinine now 2.9 mg/dL with an calculated EGFR 25 mL a minute.    In terms of his renal history, he denies hospitalizations for prior SAGE requiring RRT. He had a remote history of recurrent kidney stones d/t primary hyperparathyroidism. No reported history of frequent or recurrent use of NSAIDs/COXI. He has a history of morphea, localized scleroderma. Denies any pertinent family history of known kidney disease, or family members diagnosed with ESRD requiring dialysis.  Smoking Hx: occasional 1-2 cigs per month  Other pertinent urologic history: has BPH symptoms, no formal dx  Fluid intake: drinks 1-2L of water per day.     He has no uremic or symptoms and is in his usual state of health. He does report occasional frothy urine. He does report increased fatigue last week but improving recently. He reports gaining 30lbs over the last six months.  When factoring today's height and weight into the 2021 CKD epi equation, his EGFR is close to 36 mL a minute.    Interval history:  8/5/24 clinic visit  -completed random renal biopsy on 7/25/24 ft nodular diabetic GS RPS III. Ft Moderate tubular atrophy and severe interstitial fibrosis. Discussed results and the prognosis that he will likely need RRT at some point in his lifetime (20-90%) risk of progression to ESRD over next 5 years per [facility]  -feels well otherwise and denies acute complaints.   -will go for labs  this week  -He has no uremic or urinary symptoms and is in his usual state of health.      9/24/24 - patient is seen for ongoing CKD follow up today.  He continues to feel okay though noticed intermittent swelling of his lower extremities on occasion.  He is still taking Lasix b.i.d. with good urine output.  We did review his labs at chair side today.  Serum creatinine is up to 3.62 mg/dL now with a GFR of 19.  -we did introduce modality education today, he is interested in peritoneal dialysis should he need renal replacement therapy services.  -he is interested in transplant evaluation, we will make a referral to tolerate transplant since he works at [employer], it may be easier for him to make his appointments and workup within the same building.    12/16/24- here for ongoing CKD f/u today. He reports increasing fatigue and swelling since last visit. Reports his BP has been uptrending - he relates this to increasing stress at work, though he acknowledges this may be secondary to worsening kidney disease. He denies dysguesia, change in appetite, or insomnia. He has been referred to [facility] transplant, and he has not established yet d/t his need to reschedule the appointment due to work. We reviewed recent labs at chairside. His renal fxn based on sCr trend continues to progress. We discussed referral to general surgery for PD catheter placement and he is agreeable to proceed at this time.    Review of Systems   Constitutional:  Positive for fatigue and unexpected weight change. Negative for chills, diaphoresis and fever.   Respiratory:  Negative for cough and shortness of breath.    Cardiovascular:  Negative for chest pain and leg swelling.   Gastrointestinal:  Negative for abdominal pain, diarrhea, nausea and vomiting.   Genitourinary:  Negative for difficulty urinating, dysuria and hematuria.   Musculoskeletal:  Negative for myalgias.   Neurological:  Negative for headaches.   Hematological:  Does not  bruise/bleed easily.       The past medical, family and social histories were reviewed for this encounter.     Past Medical History:   Diagnosis Date    Diabetes mellitus     Hypertension     Sleep apnea        Current Outpatient Medications   Medication Sig    aspirin 81 MG Chew Take 81 mg by mouth once daily. **ON HOLD FOR 5 DAYS 11-7-2023**    blood-glucose sensor (DEXCOM G7 SENSOR) Cat 1 Device by Misc.(Non-Drug; Combo Route) route every 10 days.    carvediloL (COREG) 25 MG tablet Take 25 mg by mouth 2 (two) times daily.    clopidogreL (PLAVIX) 75 mg tablet Take 75 mg by mouth once daily. **ON HOLD FOR 5 DAYS 11-7-2023**    cyclobenzaprine (FLEXERIL) 10 MG tablet Take 10 mg by mouth 2 (two) times daily as needed for Muscle spasms.    dapagliflozin propanediol (FARXIGA) 10 mg tablet Take 1 tablet (10 mg total) by mouth once daily.    ferrous sulfate (FEOSOL) 325 mg (65 mg iron) Tab tablet Take 1 tablet (325 mg total) by mouth every other day.    glimepiride (AMARYL) 4 MG tablet Take 1 tablet by mouth 2 (two) times daily.    NIFEdipine (ADALAT CC) 60 MG TbSR Take 90 mg by mouth every evening.    pantoprazole (PROTONIX) 40 MG tablet Take 40 mg by mouth.    potassium chloride SA (K-DUR,KLOR-CON) 20 MEQ tablet Take 1 tablet (20 mEq total) by mouth once daily.    prednisoLONE acetate (PRED FORTE) 1 % DrpS Place into both eyes.    tamsulosin (FLOMAX) 0.4 mg Cap Take 0.4 mg by mouth once daily.    valsartan (DIOVAN) 320 MG tablet Take 1 tablet by mouth once daily.    vit C,C-Bz-txuoy-lutein-zeaxan (EYE MULTIVIT, LUTEIN-ZEAXAN,) 573-98-95-2-5 mg Cap Take 1 capsule by mouth once.    zolpidem (AMBIEN) 10 mg Tab Take 10 mg by mouth nightly as needed for Insomnia.    cloNIDine (CATAPRES) 0.2 MG tablet Take 1 tablet (0.2 mg total) by mouth every 8 (eight) hours as needed (for systolic BP >160mmHg.).    DOCOSAHEXAENOIC ACID ORAL Take by mouth.    ezetimibe (ZETIA) 10 mg tablet Take 10 mg by mouth once daily. (Patient not  "taking: Reported on 12/16/2024)    furosemide (LASIX) 80 MG tablet Take 1 tablet (80 mg total) by mouth 2 (two) times a day.    magnesium L-lactate (MAGTAB) 84 mg TbSR Take 84 mg by mouth.    milk thistle seed extract 175 mg Tab Take 1 tablet by mouth once daily.    multivitamin with minerals tablet Take 1 tablet by mouth once daily.    nitroGLYCERIN (NITROSTAT) 0.4 MG SL tablet use as directed (Patient not taking: Reported on 12/16/2024)     No current facility-administered medications for this visit.     BP (!) 226/90 (BP Location: Right arm, Patient Position: Sitting)   Ht 6' 2" (1.88 m)   BMI 34.54 kg/m²     Objective:      Physical Exam  Vitals reviewed.   Constitutional:       General: He is not in acute distress.     Appearance: He is obese.   Cardiovascular:      Rate and Rhythm: Normal rate and regular rhythm.      Pulses: Normal pulses.      Heart sounds: Normal heart sounds.      No friction rub.   Pulmonary:      Effort: Pulmonary effort is normal. No respiratory distress.      Breath sounds: Normal breath sounds.   Abdominal:      General: Abdomen is protuberant.      Palpations: Abdomen is soft.   Musculoskeletal:         General: Swelling present.      Cervical back: Normal range of motion. No tenderness.      Right lower leg: Edema (Plus one pretibial edema) present.      Left lower leg: Edema (+1 pretibial edema) present.   Neurological:      General: No focal deficit present.      Mental Status: He is oriented to person, place, and time.      Motor: No weakness.   Psychiatric:         Mood and Affect: Mood normal.         Behavior: Behavior normal.         Assessment:     Lab Results   Component Value Date    CREATININE 4.73 (H) 12/13/2024    BUN 44 (H) 12/13/2024     12/13/2024    K 4.0 12/13/2024     12/13/2024    CO2 20 (L) 12/13/2024     Lab Results   Component Value Date    .1 (H) 12/13/2024    CALCIUM 8.6 (L) 12/13/2024    PHOS 5.4 (H) 12/13/2024     Lab Results "   Component Value Date    HCT 29.9 (L) 12/13/2024     Prot/Creat Ratio, Urine   Date Value Ref Range Status   12/13/2024 11.1 (H) 0.0 - 0.2 mg/g Final   06/18/2024 95516.9 (H) 15.0 - 68.0 mg/g Final       Lab Results   Component Value Date    MICALBCREAT Unable to calculate 12/13/2024    MICALBCREAT Unable to calculate 06/18/2024           1. Stage 5 chronic kidney disease not on chronic dialysis    2. Type 2 diabetes mellitus with stage 4 chronic kidney disease, without long-term current use of insulin    3. Primary hypertension                    Plan:   Return to clinic in 6 weeks.  Labs for next visit include rfp, pth, uacr, upcr, ua  Baseline creatinine is 2.4-2.9 mg/dL.     CKD V/A3 dt DKD   -risks/etiology: biopsy proven DKD RPS III. High degree IFTA  -imaging reviewed; no doppler evidence of OLESYA  -referred to transplant service on 09/24/2024 at [facility]  - on RASI and SGLT2-I for CKD-MACE risk reduction, proteinuria reduction and michelle-protection. Increase SGLT2-I to 25mg today.  -Continue K supplementation; careful monitoring  -general surgery consult for PD catheter placement  -referral to [facility] for intake to PD program once PD catheter is placed/matured    HTN - increasing swelling; increase loop diuretic to 80mg daily w/ 2nd dose PRN for weight gain; increasing edema    Anemia in CKD - no need for MARLENI, last iron profile okay    PEACE-encouraged CPAP compliance with history of hypertension    H/o hyperparathyroidism s/p pthectomy - last PTH okay, Mag, phos, vitamin-D okay as well    Elevated BMI - We discussed making lifestyle changes and using a Mediterranean diet for health benefits and weight loss.    DM type II-continue follow up with primary care for titration.  He is a candidate for SGLT2 inhibitor is currently taking.    Hypokalemia-start on oral potassium supplementation daily    __________________________  Jewel France MD  [facility] [place]    Part of this note has been  created using [employer] dictation system. Errors in transcription may not be completely avoided.      Computed KFRE 2-Year unavailable. One or more values for this score either were not found within the given timeframe or did not fit some other criterion.    Computed KFRE 5-Year unavailable. One or more values for this score either were not found within the given timeframe or did not fit some other criterion.

## 2024-12-17 ENCOUNTER — TELEPHONE (OUTPATIENT)
Dept: NEPHROLOGY | Facility: CLINIC | Age: 54
End: 2024-12-17
Payer: COMMERCIAL

## 2024-12-17 DIAGNOSIS — N18.5 STAGE 5 CHRONIC KIDNEY DISEASE: Primary | ICD-10-CM

## 2024-12-17 NOTE — TELEPHONE ENCOUNTER
When called to make an appointment after being referred, the patient says he wants to see a doctor in [facility] for insurance-related concerns.

## 2024-12-17 NOTE — TELEPHONE ENCOUNTER
----- Message from Jewel France MD sent at 12/17/2024  8:31 AM CST -----  Regarding: Referral to [facility] PD New start  Good morning gang,    Need everyone to work their magic:  Mr. Hassan needs referral to [facility] for PD catheter placement (order placed).  Needs referral to PD at [facility]. Shannen is aware.  Hep B and PD labs placed.    Thanks,  ATR

## 2024-12-17 NOTE — TELEPHONE ENCOUNTER
DR Rosales is out of network for patient.  Patient declines going to  or [facility].     I have spoken with Dr Marcano's office in [place] who does surgery at [facility] and will fax the referral to him at [phone].

## 2024-12-30 NOTE — TELEPHONE ENCOUNTER
"Returned call to [facility]. Spoke with Jodie. She states," We are just waiting on the TB and Hep B results for clearance."    Called and spoke to patient. Patient states," I got them done this morning when I went in for my dialysis port."    Called [place] lab and confirmed TB and Hep B labs were drawn today. Tech states," The TB had to ship out. We are waiting on the results."    Called and spoke to patient. Informed patient correct labs were drawn and we are waiting on the results. Patient verbalized understanding.   "

## 2024-12-30 NOTE — TELEPHONE ENCOUNTER
----- Message from Zoe sent at 12/30/2024 11:29 AM CST -----  Regarding: chair time  Contact: anita Carrasquillo from [facility]  Type:  Needs Medical Advice    Who Called: anita Carrasquillo from [facility]    Best Call Back Number: anita Carrasquillo from [facility] phone   ext [phone]    Additional Information: they have acceptance for the pt at [facility]    Asking that adam call them back

## 2024-12-31 ENCOUNTER — TELEPHONE (OUTPATIENT)
Dept: NEPHROLOGY | Facility: CLINIC | Age: 54
End: 2024-12-31
Payer: COMMERCIAL

## 2024-12-31 NOTE — TELEPHONE ENCOUNTER
Received call from kalin with [facility] admissions. She requested patient's TB and Hep B antibodies results. Informed her that labs haven't resulted yet and will be sent once received. She verbalized understanding.

## 2025-01-02 NOTE — TELEPHONE ENCOUNTER
"Left message on voicemail  for patient to get CXR and Hep B surface ab per Lucian.  [facility] did not draw correct lab and TB test was "inderminate."  "

## 2025-01-07 ENCOUNTER — TELEPHONE (OUTPATIENT)
Dept: NEPHROLOGY | Facility: CLINIC | Age: 55
End: 2025-01-07

## 2025-01-07 NOTE — TELEPHONE ENCOUNTER
----- Message from Zoe sent at 1/7/2025 11:12 AM CST -----  Regarding: pending questions  [facility]  Type:  Needs Medical Advice    Who Called:  [facility]    Would the patient rather a call back or a response via [facility]? Call back    Best Call Back Number:  [facility]   ext [phone]    Additional Information:  requesting additional records TB clearance, Hep surface antibody, hep b core antibody total.  Pending results of quantiferon TB gold.  Has pt had access placed yet for hemo dialysis.  Fax  [phone]

## 2025-01-07 NOTE — TELEPHONE ENCOUNTER
----- Message from Zoe sent at 1/7/2025 11:12 AM CST -----  Regarding: pending questions  [facility]  Type:  Needs Medical Advice    Who Called:  [facility]    Would the patient rather a call back or a response via [facility]? Call back    Best Call Back Number:  [facility]   ext [phone]    Additional Information:  requesting additional records TB clearance, Hep surface antibody, hep b core antibody total.  Pending results of quantiferon TB gold.  Has pt had access placed yet for hemo dialysis.  Fax  [phone]

## 2025-01-10 ENCOUNTER — OFFICE VISIT (OUTPATIENT)
Dept: NEPHROLOGY | Facility: CLINIC | Age: 55
End: 2025-01-10
Payer: COMMERCIAL

## 2025-01-10 VITALS
HEIGHT: 74 IN | HEART RATE: 71 BPM | OXYGEN SATURATION: 96 % | DIASTOLIC BLOOD PRESSURE: 68 MMHG | SYSTOLIC BLOOD PRESSURE: 170 MMHG | BODY MASS INDEX: 34.54 KG/M2

## 2025-01-10 DIAGNOSIS — N18.6 ESRD (END STAGE RENAL DISEASE): Primary | ICD-10-CM

## 2025-01-10 DIAGNOSIS — I25.10 CORONARY ARTERY DISEASE, UNSPECIFIED VESSEL OR LESION TYPE, UNSPECIFIED WHETHER ANGINA PRESENT, UNSPECIFIED WHETHER NATIVE OR TRANSPLANTED HEART: ICD-10-CM

## 2025-01-10 DIAGNOSIS — N18.6 TYPE 2 DIABETES MELLITUS WITH CHRONIC KIDNEY DISEASE ON CHRONIC DIALYSIS, WITHOUT LONG-TERM CURRENT USE OF INSULIN: ICD-10-CM

## 2025-01-10 DIAGNOSIS — E11.22 TYPE 2 DIABETES MELLITUS WITH CHRONIC KIDNEY DISEASE ON CHRONIC DIALYSIS, WITHOUT LONG-TERM CURRENT USE OF INSULIN: ICD-10-CM

## 2025-01-10 DIAGNOSIS — N40.0 BENIGN PROSTATIC HYPERPLASIA, UNSPECIFIED WHETHER LOWER URINARY TRACT SYMPTOMS PRESENT: ICD-10-CM

## 2025-01-10 DIAGNOSIS — E11.21 DIABETIC NEPHROPATHY ASSOCIATED WITH TYPE 2 DIABETES MELLITUS: ICD-10-CM

## 2025-01-10 DIAGNOSIS — Z99.2 TYPE 2 DIABETES MELLITUS WITH CHRONIC KIDNEY DISEASE ON CHRONIC DIALYSIS, WITHOUT LONG-TERM CURRENT USE OF INSULIN: ICD-10-CM

## 2025-01-10 DIAGNOSIS — R80.9 PROTEINURIA, UNSPECIFIED TYPE: ICD-10-CM

## 2025-01-10 DIAGNOSIS — I10 PRIMARY HYPERTENSION: ICD-10-CM

## 2025-01-10 PROCEDURE — 99999 PR PBB SHADOW E&M-EST. PATIENT-LVL IV: CPT | Mod: PBBFAC,,, | Performed by: STUDENT IN AN ORGANIZED HEALTH CARE EDUCATION/TRAINING PROGRAM

## 2025-01-10 NOTE — PROGRESS NOTES
[facility]  Nephrology Clinic  Subjective:       HPI ID: Yo Hassan is a 54 y.o. male who returns for ongoing evaluation and management of CKD.   He has a pertinent past medical history of diabetes mellitus type 2 with history of diabetic retinopathy (dx 2007), hypertension (dx around 20 years ago), sleep apnea on CPAP (2019), coronary artery disease s/p stenting and prior history of reported chronic kidney disease.  We reviewed his lab trends at chair side. Serum creatinine every much last year very between 1.4 and 1.8 mg/dL. Most recent kidney panel from 2 weeks ago shows a serum creatinine now 2.9 mg/dL with an calculated EGFR 25 mL a minute.    In terms of his renal history, he denies hospitalizations for prior SAGE requiring RRT. He had a remote history of recurrent kidney stones d/t primary hyperparathyroidism. No reported history of frequent or recurrent use of NSAIDs/COXI. He has a history of morphea, localized scleroderma. Denies any pertinent family history of known kidney disease, or family members diagnosed with ESRD requiring dialysis.  Smoking Hx: occasional 1-2 cigs per month  Other pertinent urologic history: has BPH symptoms, no formal dx  Fluid intake: drinks 1-2L of water per day.     He has no uremic or symptoms and is in his usual state of health. He does report occasional frothy urine. He does report increased fatigue last week but improving recently. He reports gaining 30lbs over the last six months.  When factoring today's height and weight into the 2021 CKD epi equation, his EGFR is close to 36 mL a minute.    Interval history:  8/5/24 clinic visit  -completed random renal biopsy on 7/25/24 ft nodular diabetic GS RPS III. Ft Moderate tubular atrophy and severe interstitial fibrosis. Discussed results and the prognosis that he will likely need RRT at some point in his lifetime (20-90%) risk of progression to ESRD over next 5 years per [facility]ER  -feels well otherwise  and denies acute complaints.   -will go for labs this week  -He has no uremic or urinary symptoms and is in his usual state of health.      9/24/24 - patient is seen for ongoing CKD follow up today.  He continues to feel okay though noticed intermittent swelling of his lower extremities on occasion.  He is still taking Lasix b.i.d. with good urine output.  We did review his labs at chair side today.  Serum creatinine is up to 3.62 mg/dL now with a GFR of 19.  -we did introduce modality education today, he is interested in peritoneal dialysis should he need renal replacement therapy services.  -he is interested in transplant evaluation, we will make a referral to tolerate transplant since he works at [employer], it may be easier for him to make his appointments and workup within the same building.    12/16/24- here for ongoing CKD f/u today. He reports increasing fatigue and swelling since last visit. Reports his BP has been uptrending - he relates this to increasing stress at work, though he acknowledges this may be secondary to worsening kidney disease. He denies dysguesia, change in appetite, or insomnia. He has been referred to [facility] transplant, and he has not established yet d/t his need to reschedule the appointment due to work. We reviewed recent labs at chairside. His renal fxn based on sCr trend continues to progress. We discussed referral to general surgery for PD catheter placement and he is agreeable to proceed at this time.    1/10/25 - seen today for follow up.  He has since seen General surgery in the interim in his if fully healed and functioning PD catheter.  We discussed timing of initiation of renal replacement therapy and he is interested in getting started today.  We will send referral to [facility] home dialysis intake and likely we will have him started next week with training sessions.  We otherwise reviewed recent labs.  Renal function is end-stage.    Review of Systems   Constitutional:   Positive for fatigue. Negative for chills, diaphoresis and fever.   Respiratory:  Negative for cough and shortness of breath.    Cardiovascular:  Negative for chest pain and leg swelling.   Gastrointestinal:  Positive for nausea. Negative for abdominal pain, diarrhea and vomiting.   Genitourinary:  Negative for difficulty urinating, dysuria and hematuria.   Musculoskeletal:  Negative for myalgias.   Neurological:  Positive for weakness. Negative for headaches.   Hematological:  Does not bruise/bleed easily.       The past medical, family and social histories were reviewed for this encounter.     Past Medical History:   Diagnosis Date    Diabetes mellitus     Hypertension     Sleep apnea        Current Outpatient Medications   Medication Sig    aspirin 81 MG Chew Take 81 mg by mouth once daily. **ON HOLD FOR 5 DAYS 11-7-2023**    blood-glucose sensor (DEXCOM G7 SENSOR) Cat 1 Device by Misc.(Non-Drug; Combo Route) route every 10 days.    carvediloL (COREG) 25 MG tablet Take 25 mg by mouth 2 (two) times daily.    cloNIDine (CATAPRES) 0.2 MG tablet Take 1 tablet (0.2 mg total) by mouth every 8 (eight) hours as needed (for systolic BP >160mmHg.).    clopidogreL (PLAVIX) 75 mg tablet Take 75 mg by mouth once daily. **ON HOLD FOR 5 DAYS 11-7-2023**    cyclobenzaprine (FLEXERIL) 10 MG tablet Take 10 mg by mouth 2 (two) times daily as needed for Muscle spasms.    dapagliflozin propanediol (FARXIGA) 10 mg tablet Take 1 tablet (10 mg total) by mouth once daily.    DOCOSAHEXAENOIC ACID ORAL Take by mouth.    ezetimibe (ZETIA) 10 mg tablet Take 10 mg by mouth once daily.    ferrous sulfate (FEOSOL) 325 mg (65 mg iron) Tab tablet Take 1 tablet (325 mg total) by mouth every other day.    furosemide (LASIX) 80 MG tablet Take 1 tablet (80 mg total) by mouth 2 (two) times a day.    glimepiride (AMARYL) 4 MG tablet Take 1 tablet by mouth 2 (two) times daily.    magnesium L-lactate (MAGTAB) 84 mg TbSR Take 84 mg by mouth.    milk  "thistle seed extract 175 mg Tab Take 1 tablet by mouth once daily.    multivitamin with minerals tablet Take 1 tablet by mouth once daily.    NIFEdipine (ADALAT CC) 60 MG TbSR Take 90 mg by mouth every evening.    nitroGLYCERIN (NITROSTAT) 0.4 MG SL tablet use as directed    pantoprazole (PROTONIX) 40 MG tablet Take 40 mg by mouth.    potassium chloride SA (K-DUR,KLOR-CON) 20 MEQ tablet Take 1 tablet (20 mEq total) by mouth once daily.    prednisoLONE acetate (PRED FORTE) 1 % DrpS Place into both eyes.    tamsulosin (FLOMAX) 0.4 mg Cap Take 0.4 mg by mouth once daily.    valsartan (DIOVAN) 320 MG tablet Take 1 tablet by mouth once daily.    vit C,W-Jx-zknlm-lutein-zeaxan (EYE MULTIVIT, LUTEIN-ZEAXAN,) 148-09-91-2-5 mg Cap Take 1 capsule by mouth once.    zolpidem (AMBIEN) 10 mg Tab Take 10 mg by mouth nightly as needed for Insomnia.     No current facility-administered medications for this visit.     BP (!) 170/68   Pulse 71   Ht 6' 2" (1.88 m)   SpO2 96%   BMI 34.54 kg/m²     Objective:      Physical Exam  Vitals reviewed.   Constitutional:       General: He is not in acute distress.     Appearance: He is obese.   Cardiovascular:      Pulses: Normal pulses.      Heart sounds: Normal heart sounds.      No friction rub.   Pulmonary:      Effort: Pulmonary effort is normal. No respiratory distress.      Breath sounds: Normal breath sounds.   Abdominal:      General: Abdomen is protuberant.      Palpations: Abdomen is soft.      Comments: Right lower quadrant suprapubic catheter present above the belt line, exit site appears clean dry and intact with mild expected erythema, no discharge   Musculoskeletal:         General: Swelling present.      Cervical back: Normal range of motion. No tenderness.      Right lower leg: Edema (Plus one pretibial edema) present.      Left lower leg: Edema (+1 pretibial edema) present.   Neurological:      General: No focal deficit present.      Mental Status: He is oriented to " person, place, and time.      Motor: No weakness.   Psychiatric:         Mood and Affect: Mood normal.         Behavior: Behavior normal.         Assessment:     Lab Results   Component Value Date    CREATININE 4.73 (H) 12/13/2024    BUN 44 (H) 12/13/2024     12/13/2024    K 4.0 12/13/2024     12/13/2024    CO2 20 (L) 12/13/2024     Lab Results   Component Value Date    .1 (H) 12/13/2024    CALCIUM 8.6 (L) 12/13/2024    PHOS 5.4 (H) 12/13/2024     Lab Results   Component Value Date    HCT 29.9 (L) 12/13/2024     Prot/Creat Ratio, Urine   Date Value Ref Range Status   12/13/2024 11.1 (H) 0.0 - 0.2 mg/g Final   06/18/2024 75358.9 (H) 15.0 - 68.0 mg/g Final       Lab Results   Component Value Date    MICALBCREAT Unable to calculate 12/13/2024    MICALBCREAT Unable to calculate 06/18/2024           1. ESRD (end stage renal disease)    2. Type 2 diabetes mellitus with chronic kidney disease on chronic dialysis, without long-term current use of insulin    3. Primary hypertension    4. Coronary artery disease, unspecified vessel or lesion type, unspecified whether angina present, unspecified whether native or transplanted heart    5. Diabetic nephropathy associated with type 2 diabetes mellitus    6. Proteinuria, unspecified type    7. Benign prostatic hyperplasia, unspecified whether lower urinary tract symptoms present        Plan:   Return to clinic in PRN.  Start dialysis.      ESRD dt DKD   -risks/etiology: biopsy proven DKD RPS III. High degree IFTA  -imaging reviewed; no doppler evidence of OLESYA  -referred to transplant service on 09/24/2024 at [facility]  -on RASI and SGLT2-I for CKD-MACE risk reduction, proteinuria reduction and michelle-protection.   -Continue K supplementation; careful monitoring with start of PD  -send to [facility] admissions today for home modalities PD placement at [facility] trace    HTN - increasing swelling; nonadherent with daily loop diuretic.  Encouraged patient to resume planned  b.i.d. furosemide.    Anemia in CKD - no need for MARLENI, last iron profile okay    PEACE-encouraged CPAP compliance with history of hypertension    H/o hyperparathyroidism s/p pthectomy - last PTH okay, Mag, phos, vitamin-D okay as well    Elevated BMI - We discussed making lifestyle changes and using a Mediterranean diet for health benefits and weight loss.    DM type II-continue follow up with primary care for titration.  He is a candidate for SGLT2 inhibitor is currently taking.    Hypokalemia-continue on oral potassium supplementation daily    __________________________  Jewel France MD  [facility]    Part of this note has been created using [employer] dictation system. Errors in transcription may not be completely avoided.      Computed KFRE 2-Year unavailable. One or more values for this score either were not found within the given timeframe or did not fit some other criterion.    Computed KFRE 5-Year unavailable. One or more values for this score either were not found within the given timeframe or did not fit some other criterion.

## 2025-01-15 ENCOUNTER — TELEPHONE (OUTPATIENT)
Dept: NEPHROLOGY | Facility: CLINIC | Age: 55
End: 2025-01-15
Payer: COMMERCIAL

## 2025-01-15 RX ORDER — NIFEDIPINE 90 MG/1
90 TABLET, EXTENDED RELEASE ORAL NIGHTLY
Qty: 30 TABLET | Refills: 11 | Status: SHIPPED | OUTPATIENT
Start: 2025-01-15 | End: 2026-01-10

## 2025-01-15 NOTE — TELEPHONE ENCOUNTER
"Received call from patient. Patient states, "I wanted to see if Dr. France will fill a prescription for me. It's nefedipine ER 90 mg every night." Informed patient that request will be sent to MD for approval. Patient verbalized understanding.   "

## 2025-01-15 NOTE — TELEPHONE ENCOUNTER
"Called and spoke to patient to remind him to contact the dialysis unit per Dr. France to schedule dialysis education. Patient verbalized understanding, stating,"I'll call her later."  "

## 2025-01-17 RX ORDER — GENTAMICIN SULFATE 1 MG/G
CREAM TOPICAL DAILY
Qty: 30 G | Refills: 5 | Status: SHIPPED | OUTPATIENT
Start: 2025-01-17

## 2025-01-30 DIAGNOSIS — N18.6 TYPE 2 DIABETES MELLITUS WITH CHRONIC KIDNEY DISEASE ON CHRONIC DIALYSIS, WITHOUT LONG-TERM CURRENT USE OF INSULIN: Primary | ICD-10-CM

## 2025-01-30 DIAGNOSIS — Z99.2 TYPE 2 DIABETES MELLITUS WITH CHRONIC KIDNEY DISEASE ON CHRONIC DIALYSIS, WITHOUT LONG-TERM CURRENT USE OF INSULIN: Primary | ICD-10-CM

## 2025-01-30 DIAGNOSIS — E11.22 TYPE 2 DIABETES MELLITUS WITH CHRONIC KIDNEY DISEASE ON CHRONIC DIALYSIS, WITHOUT LONG-TERM CURRENT USE OF INSULIN: Primary | ICD-10-CM

## 2025-01-30 RX ORDER — INSULIN GLARGINE 100 [IU]/ML
10 INJECTION, SOLUTION SUBCUTANEOUS NIGHTLY
Qty: 9 ML | Refills: 3 | Status: SHIPPED | OUTPATIENT
Start: 2025-01-30 | End: 2026-01-30

## 2025-01-30 RX ORDER — BLOOD SUGAR DIAGNOSTIC
1 STRIP MISCELLANEOUS NIGHTLY
Qty: 90 EACH | Refills: 3 | Status: SHIPPED | OUTPATIENT
Start: 2025-01-30 | End: 2026-01-25

## 2025-01-30 NOTE — PROGRESS NOTES
Nephrology Plan of Care Note    Pt: Yo Hassan  : 1970  Date: 2025      Having higher glucose spikes with initiation of peritoneal dialysis  Will start basal insulin regiment. Patient already has Dex com  He will follow up with PCP for further titration.      Jewel France MD  [facility] [place]

## 2025-02-11 ENCOUNTER — TELEPHONE (OUTPATIENT)
Dept: NEPHROLOGY | Facility: CLINIC | Age: 55
End: 2025-02-11

## 2025-02-11 DIAGNOSIS — N18.6 ESRD ON DIALYSIS: Primary | ICD-10-CM

## 2025-02-11 DIAGNOSIS — Z99.2 ESRD ON DIALYSIS: Primary | ICD-10-CM

## 2025-03-11 ENCOUNTER — TELEPHONE (OUTPATIENT)
Dept: NEPHROLOGY | Facility: CLINIC | Age: 55
End: 2025-03-11
Payer: COMMERCIAL

## 2025-03-11 DIAGNOSIS — Z99.2 ESRD ON DIALYSIS: Primary | ICD-10-CM

## 2025-03-11 DIAGNOSIS — E83.39 HYPERPHOSPHATEMIA: ICD-10-CM

## 2025-03-11 DIAGNOSIS — N18.6 ESRD ON DIALYSIS: Primary | ICD-10-CM

## 2025-03-11 NOTE — TELEPHONE ENCOUNTER
"Received call from Dionne Nurse Navigator with [facility]. She states," I screened him a while back. I got him fast tracked and had an appointment scheduled. He no showed the appointment. He and his wife are not returning calls." Informed her office will try to reach patient to direct them to her office. She verbalized understanding.  "

## 2025-03-11 NOTE — PROGRESS NOTES
Nephrology Plan of Care Note    Pt: Yo Hassan  : 1970  Date: 2025      Rx for auryxia called into [facility] pharmacy    Rx for nephrocaps called into local pharmacy on file.      Jewel France MD  [facility] [place]

## 2025-03-11 NOTE — TELEPHONE ENCOUNTER
----- Message from Zoie sent at 3/11/2025  9:37 AM CDT -----  Contact: Dionne with [facility]  Type:  Needs Medical AdviceWho Called: Dionne with [facility] Trans[facility] ClinicSymptoms (please be specific): needs nurse to call him regarding pt. Dionne sts they can't find pt, keeps calling but no answer and he no showed his last appt.  Needs to know what to do about referral.Would the patient rather a call back or a response via [facility]? callBe Call Back Number: [phone] Information: please advise and thank you.

## 2025-03-11 NOTE — TELEPHONE ENCOUNTER
Attempted to return call to Dionne Nurse Navigator with [facility]. Unable to reach her at this time. Left voicemail.    Attempted to contact patient. Unable to reach them at this time. Left voicemail.

## 2025-03-12 NOTE — TELEPHONE ENCOUNTER
Left message on voicemail for Lauri to contact the [facility] and reiterating the importance of compliance for transplant and to let us know how we can  help.

## 2025-03-26 DIAGNOSIS — R05.2 SUBACUTE COUGH: Primary | ICD-10-CM

## 2025-03-26 DIAGNOSIS — N18.6 ESRD ON DIALYSIS: ICD-10-CM

## 2025-03-26 DIAGNOSIS — Z99.2 ESRD ON DIALYSIS: ICD-10-CM

## 2025-03-26 RX ORDER — BENZONATATE 100 MG/1
100 CAPSULE ORAL 3 TIMES DAILY PRN
Qty: 90 CAPSULE | Refills: 0 | Status: SHIPPED | OUTPATIENT
Start: 2025-03-26 | End: 2025-04-25

## 2025-06-16 DIAGNOSIS — E83.39 HYPERPHOSPHATEMIA: ICD-10-CM

## 2025-06-16 DIAGNOSIS — Z99.2 ESRD ON DIALYSIS: Primary | ICD-10-CM

## 2025-06-16 DIAGNOSIS — N18.6 ESRD ON DIALYSIS: Primary | ICD-10-CM

## 2025-06-16 RX ORDER — FERRIC CITRATE 210 MG/1
1 TABLET, FILM COATED ORAL
Qty: 90 TABLET | Refills: 11 | Status: SHIPPED | OUTPATIENT
Start: 2025-06-16 | End: 2026-06-11

## 2025-07-28 DIAGNOSIS — J06.9 UPPER RESPIRATORY TRACT INFECTION, UNSPECIFIED TYPE: Primary | ICD-10-CM

## 2025-07-28 RX ORDER — AZITHROMYCIN 250 MG/1
TABLET, FILM COATED ORAL
Qty: 6 TABLET | Refills: 0 | Status: SHIPPED | OUTPATIENT
Start: 2025-07-28 | End: 2025-08-02